# Patient Record
Sex: MALE | Race: WHITE | NOT HISPANIC OR LATINO | Employment: FULL TIME | URBAN - METROPOLITAN AREA
[De-identification: names, ages, dates, MRNs, and addresses within clinical notes are randomized per-mention and may not be internally consistent; named-entity substitution may affect disease eponyms.]

---

## 2018-11-15 ENCOUNTER — TRANSCRIBE ORDERS (OUTPATIENT)
Dept: ADMINISTRATIVE | Facility: HOSPITAL | Age: 68
End: 2018-11-15

## 2018-11-15 DIAGNOSIS — M79.602 ARM PAIN, LEFT: Primary | ICD-10-CM

## 2018-12-05 ENCOUNTER — HOSPITAL ENCOUNTER (OUTPATIENT)
Dept: INFUSION THERAPY | Facility: HOSPITAL | Age: 68
Discharge: HOME | End: 2018-12-05
Attending: INTERNAL MEDICINE | Admitting: PHYSICAL MEDICINE & REHABILITATION

## 2018-12-05 DIAGNOSIS — M79.602 ARM PAIN, LEFT: ICD-10-CM

## 2018-12-05 PROCEDURE — 95886 MUSC TEST DONE W/N TEST COMP: CPT

## 2018-12-05 PROCEDURE — 95909 NRV CNDJ TST 5-6 STUDIES: CPT

## 2018-12-05 NOTE — PROCEDURES
HISTORY:      The patient is a 68-year-old right hand dominant male who presents with a complaint of numbness and tingling of the left hand.  He states that he has been experiencing the symptoms for several years.  He reports a recent increase in severity of symptoms.    The patient denies neck pain.  He reports pain in the left shoulder.             I.  NERVE CONDUCTION STUDIES:       The distal latency of the left median motor nerve is 5.9 ms (upper limit of normal 4.3 ms).  The amplitude of evoked response is 2.9 mV.  The conduction velocity is 46 m/sec.       The distal latency of the left ulnar motor nerve is 3.7 ms.  The amplitude of evoked response is 6.1 mV at the wrist, 5.8 mV below the elbow, and 5.5 mV above the elbow.  The conduction velocity is 55 m/sec below the elbow and 43 m/sec above the elbow.       The distal latency of left median sensory nerve at 14 cm is 4.5 ms (upper limit of normal 3.6 ms).  The amplitude of evoked response is 10 microvolts.     The distal latency of left ulnar sensory nerve at 14 cm is 3.0 ms.  The amplitude of evoked response is 17 microvolts.     The distal latency of the left median sensory at 10 cm is 4.0 ms; the distal latency of the left radial sensory nerve at 10 cm is 2.1 ms (normal difference less than or equal to 0.4 ms).       II.  ELECTROMYOGRAPHY:       Electromyography was performed on the following muscles of the left upper extremity using a monopolar needle: Deltoid, biceps, brachioradialis, flexor carpi radialis, flexor carpi ulnaris, first dorsal interosseous, abductor digiti minimi, and abductor pollicis brevis.      There were no changes in insertional activity. Denervation potentials were present in the left abductor pollicis brevis.       The motor unit action potentials were of normal height and duration. The recruitment patterns were normal.       III.  IMPRESSION:        1. 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    2. There is no electrophysiologic evidence of a cervical radiculopathy or brachial plexopathy.      Atiya Alonso M.D.*    12/5/2018  10:56 AM

## 2019-09-05 ENCOUNTER — TRANSCRIBE ORDERS (OUTPATIENT)
Dept: PHYSICAL THERAPY | Facility: CLINIC | Age: 69
End: 2019-09-05

## 2019-09-05 DIAGNOSIS — M54.16 RADICULOPATHY, LUMBAR REGION: Primary | ICD-10-CM

## 2019-10-02 ENCOUNTER — HOSPITAL ENCOUNTER (OUTPATIENT)
Dept: GENERAL RADIOLOGY | Facility: HOSPITAL | Age: 69
Discharge: HOME | End: 2019-10-02
Admitting: INTERNAL MEDICINE

## 2019-10-02 DIAGNOSIS — M54.50 LOW BACK PAIN, UNSPECIFIED BACK PAIN LATERALITY, UNSPECIFIED CHRONICITY, UNSPECIFIED WHETHER SCIATICA PRESENT: ICD-10-CM

## 2019-10-02 PROCEDURE — 72110 X-RAY EXAM L-2 SPINE 4/>VWS: CPT

## 2021-01-15 ENCOUNTER — TRANSCRIBE ORDERS (OUTPATIENT)
Dept: ADMINISTRATIVE | Facility: HOSPITAL | Age: 71
End: 2021-01-15

## 2021-01-15 DIAGNOSIS — U07.1 CLINICAL DIAGNOSIS OF SEVERE ACUTE RESPIRATORY SYNDROME CORONAVIRUS 2 (SARS-COV-2) DISEASE: Primary | ICD-10-CM

## 2021-01-18 PROBLEM — U07.1 CLINICAL DIAGNOSIS OF COVID-19: Status: ACTIVE | Noted: 2021-01-18

## 2021-01-18 RX ORDER — METHYLPREDNISOLONE SODIUM SUCCINATE 125 MG/2ML
125 INJECTION, POWDER, LYOPHILIZED, FOR SOLUTION INTRAMUSCULAR; INTRAVENOUS AS NEEDED
Status: CANCELLED | OUTPATIENT
Start: 2021-01-19

## 2021-01-18 RX ORDER — DIPHENHYDRAMINE HYDROCHLORIDE 50 MG/ML
50 INJECTION INTRAMUSCULAR; INTRAVENOUS AS NEEDED
Status: CANCELLED | OUTPATIENT
Start: 2021-01-19

## 2021-01-18 RX ORDER — EPINEPHRINE 1 MG/ML
0.3 INJECTION, SOLUTION, CONCENTRATE INTRAVENOUS AS NEEDED
Status: CANCELLED | OUTPATIENT
Start: 2021-01-19

## 2021-01-18 RX ORDER — SODIUM CHLORIDE 9 MG/ML
250 INJECTION, SOLUTION INTRAVENOUS ONCE
Status: CANCELLED | OUTPATIENT
Start: 2021-01-19

## 2021-01-18 NOTE — PROGRESS NOTES
[facility]  Clinical Pharmacy Department     Pharmacy Consult/Review: COVID-19 Monoclonal Antibody    Cristiano Poole Sr. is a 70 y.o. male presenting with mild to moderate COVID-19 symptoms and has tested positive for SARS-CoV-2.    COVID-19 Monoclonal Antibody Ordered (bamlanivimab or casirivimab/imdevimab): 1/15/21  Ordering/Consulting Provider: Dr. Erick Galvan  Date of Confirmed SARS-CoV-2: 1/15/21  Date of Symptom Onset : 1/14/20    Allergies  Allergies as of 01/18/2021    (Not on File)     Microbiology  1/15: SARS-COV-2 PCR-positive    Assessment/Plan:    Patient is not hospitalized due to COVID-19 infection and does not require oxygen therapy or an increase in baseline oxygen flow rate due to COVID-19.   All inclusions, exclusions, and monitoring requirements listed below have been reviewed.    Patient has tested positive for SARS-CoV-2.  Patient is within 10 days of symptom onset.  Patient is not hospitalized due to COVID-19 infection.  Patient is not requiring oxygen therapy or an increase in baseline oxygen flow rate.   Patient is at high risk for progressing to severe COVID-19 and/or hospitalization as defined by having met the following criteria: age >/=65 years, BMI >/=35, CAD, hypertension.  Patient has no known hypersensitivity to any ingredient of bamlanivimab.  Ordering provider has documented that they obtained verbal consent and discussion of FDA EUA Fact Sheet for Patients and Caregivers (physical copy will be provided at infusion site).    Thank you for involving pharmacy in this patient's care. Please contact pharmacy with any questions or concerns.                           Zaida Ward, Pharm.D., [facility]   Clinical Staff Pharmacist  Phone Extension #[phone]  01/18/21 11:46 EST

## 2021-01-19 ENCOUNTER — HOSPITAL ENCOUNTER (OUTPATIENT)
Dept: INFUSION THERAPY | Facility: HOSPITAL | Age: 71
Discharge: HOME | End: 2021-01-19
Admitting: INTERNAL MEDICINE

## 2021-01-19 VITALS
RESPIRATION RATE: 16 BRPM | DIASTOLIC BLOOD PRESSURE: 84 MMHG | SYSTOLIC BLOOD PRESSURE: 134 MMHG | HEART RATE: 58 BPM | OXYGEN SATURATION: 98 % | TEMPERATURE: 97.5 F

## 2021-01-19 DIAGNOSIS — U07.1 CLINICAL DIAGNOSIS OF COVID-19: Primary | ICD-10-CM

## 2021-01-19 PROCEDURE — 25010000006 BAMLANIVIMAB 700 MG/20ML SOLUTION 20 ML VIAL: Performed by: INTERNAL MEDICINE

## 2021-01-19 PROCEDURE — 96366 THER/PROPH/DIAG IV INF ADDON: CPT

## 2021-01-19 PROCEDURE — 96365 THER/PROPH/DIAG IV INF INIT: CPT

## 2021-01-19 PROCEDURE — M0239 BAMLANIVIMAB-XXXX INFUSION: HCPCS | Performed by: INTERNAL MEDICINE

## 2021-01-19 RX ORDER — SODIUM CHLORIDE 9 MG/ML
250 INJECTION, SOLUTION INTRAVENOUS ONCE
Status: DISCONTINUED | OUTPATIENT
Start: 2021-01-19 | End: 2021-01-21 | Stop reason: HOSPADM

## 2021-01-19 RX ORDER — OMEGA-3/DHA/EPA/FISH OIL 60 MG-90MG
1 CAPSULE ORAL DAILY
COMMUNITY

## 2021-01-19 RX ORDER — HYDROCHLOROTHIAZIDE 12.5 MG/1
TABLET ORAL
COMMUNITY

## 2021-01-19 RX ORDER — METHYLPREDNISOLONE SODIUM SUCCINATE 125 MG/2ML
125 INJECTION, POWDER, LYOPHILIZED, FOR SOLUTION INTRAMUSCULAR; INTRAVENOUS AS NEEDED
Status: DISCONTINUED | OUTPATIENT
Start: 2021-01-19 | End: 2021-01-21 | Stop reason: HOSPADM

## 2021-01-19 RX ORDER — FOLIC ACID 1 MG/1
TABLET ORAL
COMMUNITY

## 2021-01-19 RX ORDER — METHYLPREDNISOLONE SODIUM SUCCINATE 125 MG/2ML
125 INJECTION, POWDER, LYOPHILIZED, FOR SOLUTION INTRAMUSCULAR; INTRAVENOUS AS NEEDED
Status: CANCELLED | OUTPATIENT
Start: 2021-01-19

## 2021-01-19 RX ORDER — SIMVASTATIN 40 MG
40 TABLET ORAL DAILY
COMMUNITY
Start: 2020-12-28

## 2021-01-19 RX ORDER — SODIUM CHLORIDE 9 MG/ML
250 INJECTION, SOLUTION INTRAVENOUS ONCE
Status: CANCELLED | OUTPATIENT
Start: 2021-01-19

## 2021-01-19 RX ORDER — DIPHENHYDRAMINE HYDROCHLORIDE 50 MG/ML
50 INJECTION INTRAMUSCULAR; INTRAVENOUS AS NEEDED
Status: CANCELLED | OUTPATIENT
Start: 2021-01-19

## 2021-01-19 RX ORDER — DIPHENHYDRAMINE HYDROCHLORIDE 50 MG/ML
50 INJECTION INTRAMUSCULAR; INTRAVENOUS AS NEEDED
Status: DISCONTINUED | OUTPATIENT
Start: 2021-01-19 | End: 2021-01-21 | Stop reason: HOSPADM

## 2021-01-19 RX ORDER — LISINOPRIL 20 MG/1
TABLET ORAL DAILY
COMMUNITY
Start: 2020-10-31

## 2021-01-19 RX ORDER — SILDENAFIL CITRATE 20 MG/1
TABLET ORAL
COMMUNITY

## 2021-01-19 RX ORDER — EPINEPHRINE 1 MG/ML
0.3 INJECTION, SOLUTION, CONCENTRATE INTRAVENOUS AS NEEDED
Status: DISCONTINUED | OUTPATIENT
Start: 2021-01-19 | End: 2021-01-21 | Stop reason: HOSPADM

## 2021-01-19 RX ORDER — HYDROCODONE BITARTRATE AND ACETAMINOPHEN 5; 325 MG/1; MG/1
TABLET ORAL AS NEEDED
COMMUNITY

## 2021-01-19 RX ORDER — MULTIPLE VITAMINS W/ MINERALS TAB 9MG-400MCG
TAB ORAL DAILY
COMMUNITY

## 2021-01-19 RX ORDER — CLOPIDOGREL BISULFATE 75 MG/1
75 TABLET ORAL DAILY
COMMUNITY
Start: 2020-12-28

## 2021-01-19 RX ORDER — ATENOLOL 100 MG/1
TABLET ORAL DAILY
COMMUNITY
Start: 2020-11-30

## 2021-01-19 RX ORDER — NITROGLYCERIN 0.4 MG/1
TABLET SUBLINGUAL
COMMUNITY

## 2021-01-19 RX ORDER — EPINEPHRINE 1 MG/ML
0.3 INJECTION, SOLUTION, CONCENTRATE INTRAVENOUS AS NEEDED
Status: CANCELLED | OUTPATIENT
Start: 2021-01-19

## 2021-01-19 RX ADMIN — SODIUM CHLORIDE 700 MG: 9 INJECTION, SOLUTION INTRAVENOUS at 09:20

## 2021-01-19 NOTE — PROGRESS NOTES
Patient reports chills, cough, runny nose, and headache as Covid symptoms. Patient given Bamlanivimab Patient Fact sheet, reviewed and verbalized understanding.     Patient tolerated infusion without complaints. No S/S reaction noted. Patient D/C'd from [facility] via wheelchair per screener to [address] [place] parking for transportation home.

## 2021-03-22 ENCOUNTER — BULK ORDERING (OUTPATIENT)
Dept: CASE MANAGEMENT | Facility: OTHER | Age: 71
End: 2021-03-22

## 2021-03-22 DIAGNOSIS — Z23 IMMUNIZATION DUE: ICD-10-CM

## 2021-05-03 ENCOUNTER — IMMUNIZATION (OUTPATIENT)
Dept: VACCINE CLINIC | Facility: HOSPITAL | Age: 71
End: 2021-05-03

## 2021-05-03 PROCEDURE — 91300 HC SARSCOV02 VAC 30MCG/0.3ML IM: CPT | Performed by: INTERNAL MEDICINE

## 2021-05-03 PROCEDURE — 0001A: CPT | Performed by: INTERNAL MEDICINE

## 2021-05-24 ENCOUNTER — IMMUNIZATION (OUTPATIENT)
Dept: VACCINE CLINIC | Facility: HOSPITAL | Age: 71
End: 2021-05-24

## 2021-05-24 PROCEDURE — 0002A: CPT | Performed by: INTERNAL MEDICINE

## 2021-05-24 PROCEDURE — 91300 HC SARSCOV02 VAC 30MCG/0.3ML IM: CPT | Performed by: INTERNAL MEDICINE

## 2021-12-08 ENCOUNTER — IMMUNIZATION (OUTPATIENT)
Dept: VACCINE CLINIC | Facility: HOSPITAL | Age: 71
End: 2021-12-08

## 2021-12-08 PROCEDURE — 91300 HC SARSCOV02 VAC 30MCG/0.3ML IM: CPT | Performed by: INTERNAL MEDICINE

## 2021-12-08 PROCEDURE — 0004A HC ADM SARSCOV2 30MCG/0.3ML BOOSTER: CPT | Performed by: INTERNAL MEDICINE

## 2022-05-31 ENCOUNTER — TRANSCRIBE ORDERS (OUTPATIENT)
Dept: ADMINISTRATIVE | Facility: HOSPITAL | Age: 72
End: 2022-05-31

## 2022-05-31 DIAGNOSIS — U07.1 CLINICAL DIAGNOSIS OF SEVERE ACUTE RESPIRATORY SYNDROME CORONAVIRUS 2 (SARS-COV-2) DISEASE: Primary | ICD-10-CM

## 2022-06-01 ENCOUNTER — HOSPITAL ENCOUNTER (OUTPATIENT)
Dept: INFUSION THERAPY | Facility: HOSPITAL | Age: 72
Discharge: HOME | End: 2022-06-01
Admitting: INTERNAL MEDICINE

## 2022-06-01 VITALS
DIASTOLIC BLOOD PRESSURE: 61 MMHG | HEART RATE: 57 BPM | SYSTOLIC BLOOD PRESSURE: 112 MMHG | OXYGEN SATURATION: 99 % | TEMPERATURE: 97.8 F | RESPIRATION RATE: 20 BRPM

## 2022-06-01 DIAGNOSIS — U07.1 COVID-19 VIRUS DETECTED: Primary | ICD-10-CM

## 2022-06-01 PROCEDURE — 96374 THER/PROPH/DIAG INJ IV PUSH: CPT

## 2022-06-01 PROCEDURE — M0222 HC INJECTION BEBTELOVIMAB: HCPCS | Performed by: INTERNAL MEDICINE

## 2022-06-01 PROCEDURE — 25010000002 INJECTION, BEBTELOVIMAB, 175 MG: Performed by: INTERNAL MEDICINE

## 2022-06-01 RX ORDER — DIPHENHYDRAMINE HYDROCHLORIDE 50 MG/ML
50 INJECTION INTRAMUSCULAR; INTRAVENOUS ONCE AS NEEDED
Status: DISCONTINUED | OUTPATIENT
Start: 2022-06-01 | End: 2022-06-03 | Stop reason: HOSPADM

## 2022-06-01 RX ORDER — BEBTELOVIMAB 87.5 MG/ML
175 INJECTION, SOLUTION INTRAVENOUS ONCE
Status: CANCELLED | OUTPATIENT
Start: 2022-06-01

## 2022-06-01 RX ORDER — DIPHENHYDRAMINE HCL 25 MG
50 CAPSULE ORAL ONCE AS NEEDED
OUTPATIENT
Start: 2022-06-01

## 2022-06-01 RX ORDER — METHYLPREDNISOLONE SODIUM SUCCINATE 125 MG/2ML
125 INJECTION, POWDER, LYOPHILIZED, FOR SOLUTION INTRAMUSCULAR; INTRAVENOUS AS NEEDED
OUTPATIENT
Start: 2022-06-01

## 2022-06-01 RX ORDER — METHYLPREDNISOLONE SODIUM SUCCINATE 125 MG/2ML
125 INJECTION, POWDER, LYOPHILIZED, FOR SOLUTION INTRAMUSCULAR; INTRAVENOUS AS NEEDED
Status: DISCONTINUED | OUTPATIENT
Start: 2022-06-01 | End: 2022-06-03 | Stop reason: HOSPADM

## 2022-06-01 RX ORDER — DIPHENHYDRAMINE HYDROCHLORIDE 50 MG/ML
50 INJECTION INTRAMUSCULAR; INTRAVENOUS ONCE AS NEEDED
OUTPATIENT
Start: 2022-06-01

## 2022-06-01 RX ORDER — BEBTELOVIMAB 87.5 MG/ML
175 INJECTION, SOLUTION INTRAVENOUS ONCE
Status: COMPLETED | OUTPATIENT
Start: 2022-06-01 | End: 2022-06-01

## 2022-06-01 RX ORDER — DIPHENHYDRAMINE HCL 25 MG
50 CAPSULE ORAL ONCE AS NEEDED
Status: DISCONTINUED | OUTPATIENT
Start: 2022-06-01 | End: 2022-06-03 | Stop reason: HOSPADM

## 2022-06-01 RX ORDER — SODIUM CHLORIDE 9 MG/ML
30 INJECTION, SOLUTION INTRAVENOUS ONCE
Status: DISCONTINUED | OUTPATIENT
Start: 2022-06-01 | End: 2022-06-03 | Stop reason: HOSPADM

## 2022-06-01 RX ORDER — SODIUM CHLORIDE 9 MG/ML
30 INJECTION, SOLUTION INTRAVENOUS ONCE
Status: CANCELLED | OUTPATIENT
Start: 2022-06-01 | End: 2022-06-01

## 2022-06-01 RX ADMIN — BEBTELOVIMAB 175 MG: 87.5 INJECTION, SOLUTION INTRAVENOUS at 13:55

## 2022-06-01 NOTE — PROGRESS NOTES
Patient provided with Fact Sheet for Patients, Parents and Caregivers Emergency Use Authorization (EUA) of bebtelovimab for Coronavirus Disease 2019 (COVID-19) form.    Reviewed and patient verbalized understanding.  Appropriate PPE worn during the care of the patient.  Advised patient not to receive Covid vaccine for 90 days.

## 2024-05-09 ENCOUNTER — HOSPITAL ENCOUNTER (OUTPATIENT)
Dept: GENERAL RADIOLOGY | Facility: HOSPITAL | Age: 74
Discharge: HOME | End: 2024-05-09
Payer: MEDICARE

## 2024-05-10 ENCOUNTER — HOSPITAL ENCOUNTER (OUTPATIENT)
Dept: GENERAL RADIOLOGY | Facility: HOSPITAL | Age: 74
Discharge: HOME | End: 2024-05-10
Payer: MEDICARE

## 2024-05-10 DIAGNOSIS — M17.12 ARTHRITIS OF KNEE, LEFT: ICD-10-CM

## 2024-05-10 PROCEDURE — 73562 X-RAY EXAM OF KNEE 3: CPT

## 2024-09-06 ENCOUNTER — HOSPITAL ENCOUNTER (EMERGENCY)
Facility: HOSPITAL | Age: 74
Discharge: HOME | End: 2024-09-06
Attending: EMERGENCY MEDICINE
Payer: MEDICARE

## 2024-09-06 VITALS
HEART RATE: 82 BPM | SYSTOLIC BLOOD PRESSURE: 137 MMHG | RESPIRATION RATE: 16 BRPM | TEMPERATURE: 98.2 F | OXYGEN SATURATION: 95 % | DIASTOLIC BLOOD PRESSURE: 84 MMHG

## 2024-09-06 DIAGNOSIS — R04.0 EPISTAXIS: Primary | ICD-10-CM

## 2024-09-06 LAB
APTT PPP: 44.6 SECONDS (ref 22.7–35.4)
BASOPHILS # BLD AUTO: 0.07 10*3/MM3 (ref 0–0.2)
BASOPHILS NFR BLD AUTO: 1.1 % (ref 0–1.5)
DEPRECATED RDW RBC AUTO: 46.3 FL (ref 37–54)
EOSINOPHIL # BLD AUTO: 0.13 10*3/MM3 (ref 0–0.4)
EOSINOPHIL NFR BLD AUTO: 2.1 % (ref 0.3–6.2)
ERYTHROCYTE [DISTWIDTH] IN BLOOD BY AUTOMATED COUNT: 13.6 % (ref 12.3–15.4)
HCT VFR BLD AUTO: 34.5 % (ref 37.5–51)
HGB BLD-MCNC: 11.7 G/DL (ref 13–17.7)
IMM GRANULOCYTES # BLD AUTO: 0.02 10*3/MM3 (ref 0–0.05)
IMM GRANULOCYTES NFR BLD AUTO: 0.3 % (ref 0–0.5)
INR PPP: 1.23 (ref 0.9–1.1)
LYMPHOCYTES # BLD AUTO: 1.43 10*3/MM3 (ref 0.7–3.1)
LYMPHOCYTES NFR BLD AUTO: 22.9 % (ref 19.6–45.3)
MCH RBC QN AUTO: 31.8 PG (ref 26.6–33)
MCHC RBC AUTO-ENTMCNC: 33.9 G/DL (ref 31.5–35.7)
MCV RBC AUTO: 93.8 FL (ref 79–97)
MONOCYTES # BLD AUTO: 0.4 10*3/MM3 (ref 0.1–0.9)
MONOCYTES NFR BLD AUTO: 6.4 % (ref 5–12)
NEUTROPHILS NFR BLD AUTO: 4.2 10*3/MM3 (ref 1.7–7)
NEUTROPHILS NFR BLD AUTO: 67.2 % (ref 42.7–76)
NRBC BLD AUTO-RTO: 0 /100 WBC (ref 0–0.2)
PLATELET # BLD AUTO: 204 10*3/MM3 (ref 140–450)
PMV BLD AUTO: 9 FL (ref 6–12)
PROTHROMBIN TIME: 15.8 SECONDS (ref 11.7–14.2)
RBC # BLD AUTO: 3.68 10*6/MM3 (ref 4.14–5.8)
WBC NRBC COR # BLD AUTO: 6.25 10*3/MM3 (ref 3.4–10.8)

## 2024-09-06 PROCEDURE — 85610 PROTHROMBIN TIME: CPT | Performed by: EMERGENCY MEDICINE

## 2024-09-06 PROCEDURE — 85730 THROMBOPLASTIN TIME PARTIAL: CPT | Performed by: EMERGENCY MEDICINE

## 2024-09-06 PROCEDURE — 99283 EMERGENCY DEPT VISIT LOW MDM: CPT

## 2024-09-06 PROCEDURE — 36415 COLL VENOUS BLD VENIPUNCTURE: CPT

## 2024-09-06 PROCEDURE — 85025 COMPLETE CBC W/AUTO DIFF WBC: CPT | Performed by: EMERGENCY MEDICINE

## 2024-09-06 RX ORDER — TRANEXAMIC ACID 100 MG/ML
500 INJECTION, SOLUTION INTRAVENOUS ONCE
Status: COMPLETED | OUTPATIENT
Start: 2024-09-06 | End: 2024-09-06

## 2024-09-06 RX ADMIN — TRANEXAMIC ACID 500 MG: 100 INJECTION, SOLUTION INTRAVENOUS at 16:10

## 2024-09-06 RX ADMIN — PHENYLEPHRINE HYDROCHLORIDE 2 SPRAY: 0.5 SPRAY NASAL at 16:09

## 2024-09-06 NOTE — ED PROVIDER NOTES
EMERGENCY DEPARTMENT ENCOUNTER  Room Number:  18/18  PCP: Erick Galvan Jr., MD  Independent Historians: Patient      HPI:  Chief Complaint: had concerns including Nose Bleed.     A complete HPI/ROS/PMH/PSH/SH/FH are unobtainable due to: None    Chronic or social conditions impacting patient care (Social Determinants of Health): None    Context: Cristiano Poole Sr. is a 73 y.o. male with a medical history of hypertension and CAD with stents who presents to the ED c/o acute epistaxis from the left nostril since 4:00 this morning.  Patient says he has had intermittent recurrence of bleeding throughout the day.  Incidentally, he had held his Eliquis medication earlier this week because of a colonoscopy procedure.  He just resumed the blood thinner last night with the first dose.  He has not had any Eliquis today.  He denies any recent injuries to the nose.  He denies any cough or cold or flulike symptoms.      Review of prior external notes (non-ED) -and- Review of prior external test results outside of this encounter: I independently reviewed the discharge summary from June 2, 2024 when patient was admitted for management of coronary artery disease    Prescription drug monitoring program review: LUIGI reviewed by Dereck Sosa MD       PAST MEDICAL HISTORY  Active Ambulatory Problems     Diagnosis Date Noted    Clinical diagnosis of COVID-19 01/18/2021    COVID-19 virus detected 05/31/2022     Resolved Ambulatory Problems     Diagnosis Date Noted    No Resolved Ambulatory Problems     Past Medical History:   Diagnosis Date    Ankle fracture     Diverticulitis     Hx of cardiac cath     Hypertension     Pneumonia          PAST SURGICAL HISTORY  Past Surgical History:   Procedure Laterality Date    ANKLE SURGERY      with screw placement    CARPAL TUNNEL RELEASE  12/2019    COLONOSCOPY      CORONARY ANGIOPLASTY WITH STENT PLACEMENT           FAMILY HISTORY  History reviewed. No pertinent family  history.      SOCIAL HISTORY  Social History     Socioeconomic History    Marital status:    Tobacco Use    Smoking status: Former    Smokeless tobacco: Current     Types: Chew   Substance and Sexual Activity    Alcohol use: Yes    Drug use: Never    Sexual activity: Defer         ALLERGIES  Sulfa antibiotics      REVIEW OF SYSTEMS  Review of Systems  Included in HPI  All systems reviewed and negative except for those discussed in HPI.      PHYSICAL EXAM    I have reviewed the triage vital signs and nursing notes.    ED Triage Vitals   Temp Heart Rate Resp BP SpO2   09/06/24 1424 09/06/24 1424 09/06/24 1424 09/06/24 1428 09/06/24 1424   98.2 °F (36.8 °C) 105 16 135/81 98 %      Temp src Heart Rate Source Patient Position BP Location FiO2 (%)   09/06/24 1424 09/06/24 1424 -- -- --   Tympanic Monitor          Physical Exam  GENERAL: alert, no acute distress  SKIN: Warm, dry, no rashes  HENT: Normocephalic, atraumatic, moist mucous membranes.  There is fresh blood in the left nostril but no significant active bleeding.  Posterior oropharynx appears normal.  EYES: no scleral icterus, normal conjunctivae  CV: regular rhythm, regular rate  RESPIRATORY: normal effort, lungs clear bilaterally  ABDOMEN: soft, nondistended, nontender  MUSCULOSKELETAL: no deformity, no asymmetry of extremities  NEURO: alert, moves all extremities, follows commands      LAB RESULTS  Recent Results (from the past 24 hour(s))   aPTT    Collection Time: 09/06/24  2:57 PM    Specimen: Arm, Left; Blood   Result Value Ref Range    PTT 44.6 (H) 22.7 - 35.4 seconds   Protime-INR    Collection Time: 09/06/24  2:57 PM    Specimen: Arm, Left; Blood   Result Value Ref Range    Protime 15.8 (H) 11.7 - 14.2 Seconds    INR 1.23 (H) 0.90 - 1.10   CBC Auto Differential    Collection Time: 09/06/24  2:57 PM    Specimen: Arm, Left; Blood   Result Value Ref Range    WBC 6.25 3.40 - 10.80 10*3/mm3    RBC 3.68 (L) 4.14 - 5.80 10*6/mm3    Hemoglobin 11.7 (L)  13.0 - 17.7 g/dL    Hematocrit 34.5 (L) 37.5 - 51.0 %    MCV 93.8 79.0 - 97.0 fL    MCH 31.8 26.6 - 33.0 pg    MCHC 33.9 31.5 - 35.7 g/dL    RDW 13.6 12.3 - 15.4 %    RDW-SD 46.3 37.0 - 54.0 fl    MPV 9.0 6.0 - 12.0 fL    Platelets 204 140 - 450 10*3/mm3    Neutrophil % 67.2 42.7 - 76.0 %    Lymphocyte % 22.9 19.6 - 45.3 %    Monocyte % 6.4 5.0 - 12.0 %    Eosinophil % 2.1 0.3 - 6.2 %    Basophil % 1.1 0.0 - 1.5 %    Immature Grans % 0.3 0.0 - 0.5 %    Neutrophils, Absolute 4.20 1.70 - 7.00 10*3/mm3    Lymphocytes, Absolute 1.43 0.70 - 3.10 10*3/mm3    Monocytes, Absolute 0.40 0.10 - 0.90 10*3/mm3    Eosinophils, Absolute 0.13 0.00 - 0.40 10*3/mm3    Basophils, Absolute 0.07 0.00 - 0.20 10*3/mm3    Immature Grans, Absolute 0.02 0.00 - 0.05 10*3/mm3    nRBC 0.0 0.0 - 0.2 /100 WBC         RADIOLOGY  No Radiology Exams Resulted Within Past 24 Hours      MEDICATIONS GIVEN IN ER  Medications   tranexamic acid 500 MG/5ML nasal (ED/Crit Care for epistaxis) - VIAL DISPENSE 500 mg (500 mg Nasal Given by Other 9/6/24 1610)         ORDERS PLACED DURING THIS VISIT:  Orders Placed This Encounter   Procedures    aPTT    Protime-INR    CBC Auto Differential    CBC & Differential         OUTPATIENT MEDICATION MANAGEMENT:  No current Epic-ordered facility-administered medications on file.     Current Outpatient Medications Ordered in Epic   Medication Sig Dispense Refill    Aspirin Buf,CaCarb-MgCarb-MgO, 81 MG tablet Daily.      atenolol (TENORMIN) 100 MG tablet Take  by mouth Daily.      Cholecalciferol (VITAMIN D3 PO) Daily.      clopidogrel (PLAVIX) 75 MG tablet Take 75 mg by mouth Daily.      folic acid (FOLVITE) 1 MG tablet folic acid 1 mg tablet   Take one tablet daily #30 Substitutions-      GLUCOSAMINE-CHONDROITIN PO Take  by mouth Daily.      hydroCHLOROthiazide (HYDRODIURIL) 12.5 MG tablet hydrochlorothiazide 12.5 mg tablet   TAKE 1 TABLET BY MOUTH EVERY DAY      HYDROcodone-acetaminophen (NORCO) 5-325 MG per tablet As  Needed.      lisinopril (PRINIVIL,ZESTRIL) 20 MG tablet Take  by mouth Daily.      metFORMIN (GLUCOPHAGE) 500 MG tablet Take 500 mg by mouth 2 (Two) Times a Day.      multivitamin with minerals tablet tablet Daily.      nitroglycerin (NITROSTAT) 0.4 MG SL tablet nitroglycerin 0.4 mg sublingual tablet      Omega-3 Fatty Acids (fish oil) 500 MG capsule capsule Take 1 capsule by mouth Daily.      Pyridoxine HCl (VITAMIN B-6 PO) Daily.      sildenafil (REVATIO) 20 MG tablet sildenafil (pulmonary hypertension) 20 mg tablet   Take 1-2 tabs by mouth as needed  No nitroglycerin within 48 hours of this medication.      simvastatin (ZOCOR) 40 MG tablet Take 40 mg by mouth Daily.           PROCEDURES  Procedures        PROGRESS, DATA ANALYSIS, CONSULTS, AND MEDICAL DECISION MAKING  All labs have been independently interpreted by me.  All radiology studies have been reviewed by me. All EKG's have been independently viewed and interpreted by me.  Discussion below represents my analysis of pertinent findings related to patient's condition, differential diagnosis, treatment plan and final disposition.    Differential diagnosis includes but is not limited to epistaxis, sinusitis, coagulopathy, anemia, hypertension.    Clinical Scores:                   ED Course as of 09/06/24 2040   Fri Sep 06, 2024   1526 Hemoglobin(!): 11.7 [COREY]   1526 Hematocrit(!): 34.5 [COREY]   1540 INR(!): 1.23 [COREY]   1540 Protime(!): 15.8 [COREY]   1540 PTT(!): 44.6 [COREY]   1540 I administered Afrin and then TXA via atomizer.  He is currently not bleeding.  Will continue to watch him for little while longer here. [COREY]   1618 I reassessed the patient.  He is not showing any signs of rebleeding.  I think he is safe for discharge.  Will have him self administer Afrin again tonight and tomorrow morning and then stop use of the Afrin bottle.  Will encourage follow-up with PCP and/or ENT doctor within the next week for repeat evaluation.  Will discuss with him usual  "\"return to ER\" instructions prior to discharge. [COREY]      ED Course User Index  [COREY] Dereck Sosa MD             AS OF 20:40 EDT VITALS:    BP - 137/84  HR - 82  TEMP - 98.2 °F (36.8 °C) (Tympanic)  O2 SATS - 95%    COMPLEXITY OF CARE  Admission was considered but after careful review of the patient's presentation, physical examination, diagnostic results, and response to treatment the patient may be safely discharged with outpatient follow-up.      DIAGNOSIS  Final diagnoses:   Epistaxis         DISPOSITION  ED Disposition       ED Disposition   Discharge    Condition   Stable    Comment   --                Please note that portions of this document were completed with a voice recognition program.    Note Disclaimer: At [facility], we believe that sharing information builds trust and better relationships. You are receiving this note because you recently visited [facility]. It is possible you will see health information before a provider has talked with you about it. This kind of information can be easy to misunderstand. To help you fully understand what it means for your health, we urge you to discuss this note with your provider.         Dereck Sosa MD  09/06/24 2040    "

## 2024-09-06 NOTE — DISCHARGE INSTRUCTIONS
As we discussed, use the Afrin spray 1 time tonight and 1 time tomorrow morning and then discontinue use of the Afrin bottle after that.  Please return to the emergency room for any worsening bleeding, pain, difficulties breathing or any other concerns.

## 2025-04-15 ENCOUNTER — TRANSCRIBE ORDERS (OUTPATIENT)
Dept: ADMINISTRATIVE | Facility: HOSPITAL | Age: 75
End: 2025-04-15
Payer: MEDICARE

## 2025-04-15 ENCOUNTER — TRANSCRIBE ORDERS (OUTPATIENT)
Dept: PHYSICAL THERAPY | Facility: CLINIC | Age: 75
End: 2025-04-15
Payer: MEDICARE

## 2025-04-15 DIAGNOSIS — M54.12 RADICULOPATHY, CERVICAL: Primary | ICD-10-CM

## 2025-04-15 DIAGNOSIS — G56.02 CARPAL TUNNEL SYNDROME ON LEFT: Primary | ICD-10-CM

## 2025-05-09 ENCOUNTER — HOSPITAL ENCOUNTER (OUTPATIENT)
Dept: NEUROLOGY | Facility: HOSPITAL | Age: 75
Discharge: HOME | End: 2025-05-09
Payer: MEDICARE

## 2025-05-09 DIAGNOSIS — G56.02 CARPAL TUNNEL SYNDROME ON LEFT: ICD-10-CM

## 2025-05-09 PROCEDURE — 95886 MUSC TEST DONE W/N TEST COMP: CPT

## 2025-05-09 PROCEDURE — 95910 NRV CNDJ TEST 7-8 STUDIES: CPT

## 2025-05-09 NOTE — PROCEDURES
"EMG and Nerve Conduction Studies    I.      Instrument used: Neuromax 1002  II.     Please see data sheets for tabular summary of NCS and details on methods, temperatures and lab standards.   III.    EMG muscles tested for upper extremity studies include the deltoid, biceps, triceps, pronator teres, extensor digitorum communis, first dorsal interosseous and abductor pollicis brevis.    IV.   EMG muscles tested for lower extremity studies include the vastus lateralis, tibialis anterior, peroneus longus, medial gastrocnemius and extensor digitorum brevis.    V.    Additional muscles tested as needed.  Paraspinal muscles tested as needed.   VI.   Please see data sheets for tabular summary of EMG findings.   VII. The complete report includes the data sheets.      Indication: Numbness tingling in digits 1 and 2 left hand.  Numbness and significant pain in the whole arm from the shoulder down if sitting in his arm chair with his elbow on the arm of the chair.  He has some mild neck pain.  He has prediabetes and no thyroid disease.  History: 74-year-old male with history as above      Ht: 68 inches  Wt: 210 pounds  HbA1C:   Lab Results   Component Value Date    HGBA1C 6.3 (H) 05/26/2024     TSH: No results found for: \"TSH\"    Technical summary:  Nerve conduction studies were obtained in the left arm with some comparisons on the right where indicated.  Skin temperatures were at least 32 °C measured on the palms.  Needle examination was obtained on selected muscles of the left arm.    Results:  1.  Prolonged median antidromic sensory distal latencies bilaterally at 4.4 ms on the left and 4.7 ms on the right with low amplitudes of 10.7 µV on the left and 11.3 µV on the right.  2.  Normal ulnar antidromic sensory distal latencies bilaterally with low amplitudes of 5.7 µV on the left and 8.7 µV on the right.  3.  Normal left radial sensory distal latency with low amplitude of 12.3 µV.  4.  Slow left median motor conduction " velocity at 47.5 m/s with prolonged distal latency of 5.0 ms.  The amplitudes were normal.  5.  Slow left ulnar motor conduction velocities at 47.2 m/s below the elbow and 41.1 m/s in the short segment across the elbow.  Normal distal latency with normal amplitudes from below the elbow and at the wrist but mildly low amplitude from above the elbow at 3.8 mV.  6.  Needle examination of selected muscles of the left arm showed 2+ fibrillations and positive sharp waves in the triceps and pronator teres with an increased number of large motor units increased firing rates and reduced interference patterns more notable in the triceps.  There were normal insertional activities in all other muscles tested.  There was a mild increased number of large motor units in the abductor pollicis brevis and first dorsal interosseous with some increase in firing rate and reduced interference pattern.  Cervical paraspinals at C6 and C7 showed no abnormality.    Impression:  Abnormal study most consistent with moderate peripheral neuropathy.  In addition there is evidence of an acute/subacute left C7 (or perhaps C6) radiculopathy versus middle trunk plexopathy.  (No needle exam changes were seen in the paraspinals to demonstrate root level involvement, however).  Study results were discussed with the patient.    Jimenez Quintanilla M.D.              Dictated utilizing Dragon dictation.

## 2025-06-23 ENCOUNTER — TREATMENT (OUTPATIENT)
Dept: PHYSICAL THERAPY | Facility: CLINIC | Age: 75
End: 2025-06-23
Payer: MEDICARE

## 2025-06-23 DIAGNOSIS — R29.898 LEFT LEG WEAKNESS: ICD-10-CM

## 2025-06-23 DIAGNOSIS — R29.898 LEFT ARM WEAKNESS: ICD-10-CM

## 2025-06-23 DIAGNOSIS — M54.16 RADICULOPATHY, LUMBAR REGION: Primary | ICD-10-CM

## 2025-06-23 DIAGNOSIS — R52 PAIN AGGRAVATED BY WALKING: ICD-10-CM

## 2025-06-23 DIAGNOSIS — M54.12 RADICULOPATHY, CERVICAL: ICD-10-CM

## 2025-06-23 PROCEDURE — 97161 PT EVAL LOW COMPLEX 20 MIN: CPT | Performed by: PHYSICAL THERAPIST

## 2025-06-23 PROCEDURE — 97110 THERAPEUTIC EXERCISES: CPT | Performed by: PHYSICAL THERAPIST

## 2025-06-23 NOTE — PROGRESS NOTES
Physical Therapy Initial Evaluation and Plan of Care    [facility] [place] [address]  [phone] (phone)  [phone] (fax)      Patient: Cristiano Poole Sr.   : 1950  Diagnosis/ICD-10 Code:  Radiculopathy, lumbar region [M54.16]  Referring practitioner: Erick Galvan Jr.,*  Date of Initial Visit: 2025  Today's Date: 2025  Patient seen for 1 sessions    Visit Diagnoses:    ICD-10-CM ICD-9-CM   1. Radiculopathy, lumbar region  M54.16 724.4   2. Radiculopathy, cervical  M54.12 723.4   3. Left arm weakness  R29.898 729.89   4. Left leg weakness  R29.898 729.89   5. Pain aggravated by walking  R52 780.96              Subjective Evaluation    History of Present Illness  Mechanism of injury: In November, he noticed pain in the left arm and inability to sleep on that side. He also has weakness in the left arm.   [place].  He was moving the dock and cutting grass. He did not feel one painful episode but the left leg hurt that afternoon.  He knew he did too much that morning.   It is painful to walk. He has been doing more sitting.     Nerve conduction study show left C7 acute radiculopathy,   MD prescribed a steroid pack last week for the leg pain. Sometimes the pain is less and then he takes a step and the pain is back.  The pain is between the left hip and the left knee and the left knee is numb. It may disturb his sleep.   He feels that pain when he stands up or walks. He feels more pain at the left lower back and left buttocks with more sitting than he is used to.   possible C6 as well.  PMH:  Carpal tunnel surgery B  He has not played golf in past month.  He is generally active.  He has cut and weeded the grass.  [place]     Pain  Pain scale: Left arm 3/10, left leg 5/10.  Pain scale at highest: Left arm 5/10,  left leg 8/10.  Aggravating factors: standing and  ambulation    Social Support  Lives in: one-story house (stairs to basement)  Lives with: spouse    Treatments  Current treatment: medication  Current treatment comments: Steroid pack.   Patient Goals  Patient goals for therapy: decreased pain, increased strength, independence with ADLs/IADLs and return to sport/leisure activities             Objective          Static Posture   General Observations  Scoliosis.     Head  Forward.    Shoulders  Rounded.    Lumbar Spine   Decreased lordosis.     Pelvis   Pelvis (Left): Elevated.     Palpation     Right   Hypertonic in the levator scapulae and upper trapezius. Tenderness of the levator scapulae and upper trapezius.     Neurological Testing     Sensation     Wrist/Hand   Left   Diminished: light touch    Right   Intact: light touch    Comments   Left light touch: Numb thumb and second fingers     Lumbar   Left   Diminished: light touch    Comments   Left light touch: Left ant knee    Active Range of Motion   Cervical/Thoracic Spine   Cervical    Flexion: 29 degrees   Extension: 31 degrees   Left lateral flexion: 13 degrees   Right lateral flexion: 13 degrees   Left rotation: 37 degrees   Right rotation: 40 degrees   Left Shoulder   Flexion: Left shoulder active forward flexion: Limited raising arm overhead.     Lumbar   Flexion: Active lumbar flexion: 2/3.   Extension: Active lumbar extension: 1/4 increases pain to left leg.   Left lateral flexion: Active left lumbar lateral flexion: 1/2 increases pain to left leg.   Right lateral flexion: Active right lumbar lateral flexion: 1/2.   Left rotation: Active left lumbar rotation: 2/3.   Right rotation: Active right lumbar rotation: 2/3.     Passive Range of Motion   Left Shoulder   Normal passive range of motion    Strength/Myotome Testing     Left Shoulder     Planes of Motion   Flexion: 3+   External rotation at 0°: 4+   Internal rotation at 0°: 4     Right Shoulder     Planes of Motion   Flexion: 5     Left Elbow    Flexion: 4+  Extension: 4    Right Elbow   Flexion: 5  Extension: 5    Left Wrist/Hand   Wrist extension: 4  Wrist flexion: 5     (2nd hand position)     Trial 1: 22 lbs    Right Wrist/Hand   Wrist extension: 5  Wrist flexion: 5     (2nd hand position)     Trial 1: 66 lbs    Left Hip   Planes of Motion   Flexion: 4-    Right Hip   Planes of Motion   Flexion: 5    Left Knee   Flexion: 4  Extension: 4    Right Knee   Flexion: 4+  Extension: 4+    Left Ankle/Foot   Dorsiflexion: 5  Plantar flexion: 5    Right Ankle/Foot   Dorsiflexion: 5  Plantar flexion: 5    Additional Strength Details  Able to bridge hips    Tests   Cervical     Left   Negative Spurling's sign.     Lumbar     Left   Negative passive SLR.     Right   Negative passive SLR.     Lumbar Flexibility Comments:   Mod tightness of B hip flexors, hamstrings, hip rotators     Ambulation   Weight-Bearing Status   Assistive device used: none    Observational Gait   Gait: antalgic   Decreased left stance time.     Additional Observational Gait Details  Left foot turned out to the side and walking increases the pain in the left leg   Gait on not smooth with increased sway side to side     Functional Assessment     Single Leg Stance   Left: 3 (Increases the pain in the left leg) seconds  Right: 3 seconds        See Exercise, Manual, and Modality Logs for complete treatment.   Access Code: [email or web address]  URL: [email or web address]  Date: 06/23/2025  Prepared by: Chantal Gonzalez    Exercises  - Standing 'L' Stretch at Counter  - 2 x daily - 7 x weekly - 1 sets - 3 reps - 20 sec hold  - Seated Flexion Stretch with Swiss Ball  - 2 x daily - 7 x weekly - 1 sets - 3 reps - 20 sec hold  - Hooklying Single Knee to Chest Stretch  - 2 x daily - 7 x weekly - 1 sets - 2 reps - 20 sec hold  - Supine Double Knee to Chest  - 2 x daily - 7 x weekly - 1 sets - 2 reps - 20 sec hold  - Supine Piriformis Stretch with Foot on Ground  - 2 x daily - 7 x weekly - 1 sets - 2  reps - 20 sec hold  - Supine Hamstring Stretch  - 2 x daily - 7 x weekly - 1 sets - 2 reps - 20 sec hold  - Supine March  - 2 x daily - 7 x weekly - 1 sets - 10 reps    Functional Outcome Score:   Modified Oswestry score is 44% disability with highest reported disability for standing, walking, sleeping and pain and Neck Disability Index (NDI) score is 11 /50 that is 22% disability with highest reported disability for sleeping, working and pain        Assessment & Plan       Assessment  Impairments: abnormal gait, abnormal or restricted ROM, activity intolerance, impaired balance, impaired physical strength, lacks appropriate home exercise program and pain with function   Functional limitations: sleeping, walking, uncomfortable because of pain, standing and reaching overhead   Assessment details: Cristiano Poole Sr. is a 74 y.o. active male referred to physical therapy for cervical and lumbar radiculopathy affecting the left arm and left leg. He presents with an evolving clinical presentation. The left leg symptoms started one week ago.  He has no known comorbidities or personal factors that may affect his progress in the plan of care.  Signs and symptoms are consistent with physical therapy diagnosis of left cervical radiculopathy causing numbness in the left thumb and second finger,  weakness and shoulder weakness. He has left lumbar radiculopathy with hip weakness and mild limp.  He is limited with standing, walking and sleeping.   Prognosis: good    Goals  Plan Goals: STGs to be met by:  08/11/25    STG 1 Patient will demonstrate an independent HEP for core  strength and ROM/flexibility.    STG 2 Patient will be independent with good back and neck postures and body mechanics to avoid strain to spine with ADLs    STG 3 Patient will report decreased acute left lower extremity pain from 5-8/10 to 0-3/10 that allows him to walk without limping    STG 4 Patient will increase his left  strength from 22 to 32 pounds  with reduced finger numbness by 20% or more so that he can perform fine motor activities better without dropping     STG 5 Patient will be able to sleep on his left side more comfortably by 25% or more    LTGs to be achieved by: 09/21/25    LTG 1 Patient will feel that he is able to be more active including returning to playing golf and performing regular yard work     LTG 2 Patient will be independent with a long term exercise plan for conditioning, strength and flexibility to manage his spine    LTG 3 Patient will report decreased functional disability on Modified Oswestry score from 42 % to 20 % or less improved for walking, standing and sleeping     LTG 4 Patient will increase his left  strength from 22 to 42 pounds with reduced finger numbness by 40% or more so that can better manage tools     Plan  Therapy options: will be seen for skilled therapy services  Planned modality interventions: traction  Planned therapy interventions: abdominal trunk stabilization, neuromuscular re-education, manual therapy, body mechanics training, flexibility, home exercise program, therapeutic activities and strengthening  Frequency: 2x week  Duration in weeks: 12  Treatment plan discussed with: patient        Timed:         Manual Therapy:         mins  74413;     Therapeutic Exercise:    12     mins  67684;     Neuromuscular Jose Roberto:        mins  07586;    Therapeutic Activity:          mins  58505;     Gait Training:           mins  15990;     Ultrasound:          mins  44005;    Self Care                            mins   38972      Un-Timed:  Electrical Stimulation:         mins  13683 ( );  Dry Needling  (1-2 muscles)                 mins 20560 (Self-pay)  Dry Needling (3-4 muscles)      mins 20561 (Self-pay)  Dry Needling Trial         mins DRYNDLTRIAL  (No Charge)  Traction          mins 66855  Low Eval     33     Mins  83136  Mod Eval          Mins  08089  High Eval                            Mins  34355    Timed  Treatment:   12   mins   Total Treatment:     45   mins    PT SIGNATURE: Chantal Gonzalez PT     [place] License Number: [id number]    Electronically signed by Chantal Gonzalez PT, 06/23/25, 8:48 AM EDT    DATE TREATMENT INITIATED: 6/23/2025    Medicare Initial Certification  Certification Period: 9/21/2025  I certify that the therapy services are furnished while this patient is under my care.  The services outlined above are required by this patient, and will be reviewed every 90 days.     PHYSICIAN: Erick Galvan Jr., MD   NPI: [id number]                                         DATE:     Please sign and return via fax to [phone] Thank you, [facility].

## 2025-06-24 ENCOUNTER — TRANSCRIBE ORDERS (OUTPATIENT)
Dept: PHYSICAL THERAPY | Facility: CLINIC | Age: 75
End: 2025-06-24
Payer: MEDICARE

## 2025-06-24 DIAGNOSIS — M54.16 RADICULOPATHY, LUMBAR REGION: Primary | ICD-10-CM

## 2025-07-08 ENCOUNTER — TRANSCRIBE ORDERS (OUTPATIENT)
Dept: ADMINISTRATIVE | Facility: HOSPITAL | Age: 75
End: 2025-07-08
Payer: MEDICARE

## 2025-07-08 DIAGNOSIS — M54.16 LUMBAR RADICULOPATHY: Primary | ICD-10-CM

## 2025-07-17 ENCOUNTER — TREATMENT (OUTPATIENT)
Dept: PHYSICAL THERAPY | Facility: CLINIC | Age: 75
End: 2025-07-17
Payer: MEDICARE

## 2025-07-17 DIAGNOSIS — R29.898 LEFT LEG WEAKNESS: ICD-10-CM

## 2025-07-17 DIAGNOSIS — R52 PAIN AGGRAVATED BY WALKING: ICD-10-CM

## 2025-07-17 DIAGNOSIS — M54.12 RADICULOPATHY, CERVICAL: Primary | ICD-10-CM

## 2025-07-17 DIAGNOSIS — M54.16 RADICULOPATHY, LUMBAR REGION: ICD-10-CM

## 2025-07-17 DIAGNOSIS — R29.898 LEFT ARM WEAKNESS: ICD-10-CM

## 2025-07-17 NOTE — PROGRESS NOTES
Physical Therapy Daily Treatment Note    [facility]  [address] [place] [address]  [phone] (phone)  [phone] (fax)    Patient: Cristiano Poole Sr.   : 1950  Diagnosis/ICD-10 Code:  Radiculopathy, cervical [M54.12]  Referring practitioner: Erick Galvan Jr.,*  Today's Date: 2025  Patient seen for 2 sessions    Visit Diagnoses:    ICD-10-CM ICD-9-CM   1. Radiculopathy, cervical  M54.12 723.4   2. Radiculopathy, lumbar region  M54.16 724.4   3. Left arm weakness  R29.898 729.89   4. Left leg weakness  R29.898 729.89   5. Pain aggravated by walking  R52 780.96              Subjective Cristiano reports that his left arm hurt using the weed eater and he felt his back more riding tractor on uneven ground.  He reports that he is worried about lying flat for upcoming heart MRI test due to the pain in the left arm is he lies flat.    Objective   See Exercise, Manual, and Modality Logs for complete treatment.   Access Code: [email or web address]  URL: [email or web address]  Date: 2025  Prepared by: Chantal Gonzalez    Exercises  - Standing 'L' Stretch at Counter  - 2 x daily - 7 x weekly - 1 sets - 3 reps - 20 sec hold  - Seated Flexion Stretch with Swiss Ball  - 2 x daily - 7 x weekly - 1 sets - 3 reps - 20 sec hold  - Hooklying Single Knee to Chest Stretch  - 2 x daily - 7 x weekly - 1 sets - 2 reps - 20 sec hold  - Supine Double Knee to Chest  - 2 x daily - 7 x weekly - 1 sets - 2 reps - 20 sec hold  - Supine Piriformis Stretch with Foot on Ground  - 2 x daily - 7 x weekly - 1 sets - 2 reps - 20 sec hold  - Supine Hamstring Stretch  - 2 x daily - 7 x weekly - 1 sets - 2 reps - 20 sec hold  - Supine March  - 2 x daily - 7 x weekly - 1 sets - 10 reps  - Standing Row with Anchored Resistance  - 1 x daily - 7 x weekly - 1 sets - 10 reps - 5 sec hold New GREEN band issued   - Doorway Pec Stretch at 60 Elevation  - 1 x daily - 7 x weekly - 1 sets - 6 reps - 10 sec  hold New  - Seated Cervical Sidebending AROM  - 1 x daily - 7 x weekly - 1 sets - 3 reps - 10 sec hold New  - Gentle Levator Scapulae Stretch  - 1 x daily - 7 x weekly - 1 sets - 3 reps - 10 sec hold New  - Seated Hamstring Stretch  - 1 x daily - 7 x weekly - 1 sets - 3 reps - 20 sec hold New    Neck posture emphasized with exercises.    Assessment/Plan    Neck feels good with the stretching exercises performed today.  He liked how the the yoga block used under his head supported his neck and alleviated the discomfort in the left arm.  His left hand  does feel somewhat weak gripping the band.        Timed:    Manual Therapy:    10     mins  33653;  Therapeutic Exercise:    30     mins  38280;     Neuromuscular Jose Roberto:    2    mins  75548;    Therapeutic Activity:          mins  53387;     Gait Training:           mins  82761;     Ultrasound:          mins  75995;    Aquatic Therapy           mins     47845;  Self Care                               mins   13988        Untimed:  Electrical Stimulation:           mins  69619 ( );  Traction:           mins  37265;   Dry Needling  (1-2 muscles)                  mins 20560 (Self-pay)  Dry Needling (3-4 muscles)  ____  20561 (Self-pay)  Dry Needling Trial             DRYNDLTRIAL  (No Charge)    Timed Treatment:   42   mins   Total Treatment:     42   mins    Chantal Gonzalez PT  Physical Therapist    [place] License:[id number]

## 2025-07-22 ENCOUNTER — TREATMENT (OUTPATIENT)
Dept: PHYSICAL THERAPY | Facility: CLINIC | Age: 75
End: 2025-07-22
Payer: MEDICARE

## 2025-07-22 DIAGNOSIS — R29.898 LEFT LEG WEAKNESS: ICD-10-CM

## 2025-07-22 DIAGNOSIS — M54.16 RADICULOPATHY, LUMBAR REGION: ICD-10-CM

## 2025-07-22 DIAGNOSIS — R29.898 LEFT ARM WEAKNESS: ICD-10-CM

## 2025-07-22 DIAGNOSIS — R52 PAIN AGGRAVATED BY WALKING: ICD-10-CM

## 2025-07-22 DIAGNOSIS — M54.12 RADICULOPATHY, CERVICAL: Primary | ICD-10-CM

## 2025-07-22 PROCEDURE — 97530 THERAPEUTIC ACTIVITIES: CPT | Performed by: PHYSICAL THERAPIST

## 2025-07-22 PROCEDURE — 97140 MANUAL THERAPY 1/> REGIONS: CPT | Performed by: PHYSICAL THERAPIST

## 2025-07-22 PROCEDURE — 97110 THERAPEUTIC EXERCISES: CPT | Performed by: PHYSICAL THERAPIST

## 2025-07-22 NOTE — PROGRESS NOTES
Physical Therapy Daily Treatment Note    [facility]  [address] [place] [address]  [phone] (phone)  [phone] (fax)    Patient: Cristiano Poole Sr.   : 1950  Diagnosis/ICD-10 Code:  Radiculopathy, cervical [M54.12]  Referring practitioner: Erick Galvan Jr.,*  Today's Date: 2025  Patient seen for 3 sessions    Visit Diagnoses:    ICD-10-CM ICD-9-CM   1. Radiculopathy, cervical  M54.12 723.4   2. Radiculopathy, lumbar region  M54.16 724.4   3. Left arm weakness  R29.898 729.89   4. Left leg weakness  R29.898 729.89   5. Pain aggravated by walking  R52 780.96              Subjective Cristiano reports that his neck has been a little sore.  He has not had as many symptoms in the left arm.  He was able to ride mower and weed eat grass this morning.  He felt better after last session.     Objective   See Exercise, Manual, and Modality Logs for complete treatment.     He has yoga block.  Recommend he lie his head on yoga block for 5 minutes.    Assessment/Plan    Left arm symptoms may be aggravated more if his head is not fullt flexed for manual traction.  Left arm was aggravated by the seated row exercise.  Plan to review exercises he can perform with his new bands.       Timed:    Manual Therapy:    15     mins  62829;  Therapeutic Exercise:    20     mins  67260;     Neuromuscular Jose Roberto:        mins  62101;    Therapeutic Activity:     5     mins  55229;     Gait Training:           mins  64575;     Ultrasound:          mins  52669;    Aquatic Therapy           mins     14275;  Self Care                               mins   45845        Untimed:  Electrical Stimulation:           mins  40532 ( );  Traction:           mins  12946;   Dry Needling  (1-2 muscles)                  mins  (Self-pay)  Dry Needling (3-4 muscles)  ____   (Self-pay)  Dry Needling Trial             DRYNDLTRIAL  (No Charge)    Timed Treatment:   40   mins   Total  Treatment:     40   mins    Chantal Gonzalez, PT  Physical Therapist    [place] License:[id number]

## 2025-07-24 ENCOUNTER — TREATMENT (OUTPATIENT)
Dept: PHYSICAL THERAPY | Facility: CLINIC | Age: 75
End: 2025-07-24
Payer: MEDICARE

## 2025-07-24 DIAGNOSIS — M54.12 RADICULOPATHY, CERVICAL: Primary | ICD-10-CM

## 2025-07-24 DIAGNOSIS — M54.16 RADICULOPATHY, LUMBAR REGION: ICD-10-CM

## 2025-07-24 DIAGNOSIS — R29.898 LEFT ARM WEAKNESS: ICD-10-CM

## 2025-07-24 DIAGNOSIS — R52 PAIN AGGRAVATED BY WALKING: ICD-10-CM

## 2025-07-24 DIAGNOSIS — R29.898 LEFT LEG WEAKNESS: ICD-10-CM

## 2025-07-24 NOTE — PROGRESS NOTES
30-Day / 10-Visit Progress Note       [facility]  [address] [place] [address]  [phone] (phone)  [phone] (fax)    Patient: Cristiano Poole Sr.   : 1950  Diagnosis/ICD-10 Code:  Radiculopathy, cervical [M54.12]  Referring practitioner: Erick Galvan Jr.,*  Date of Initial Visit: Type: THERAPY  Noted: 2025  Today's Date: 2025  Patient seen for 4 sessions      ICD-10-CM ICD-9-CM   1. Radiculopathy, cervical  M54.12 723.4   2. Radiculopathy, lumbar region  M54.16 724.4   3. Left arm weakness  R29.898 729.89   4. Left leg weakness  R29.898 729.89   5. Pain aggravated by walking  R52 780.96         Subjective:     Clinical Progress: improved  Home Program Compliance: Yes  Treatment has included:  therapeutic exercise, manual therapy, and patient education with home exercise program     Subjective Cristiano reports that he has been doing some yard work.  He does not feel that he is limping.  He reports no change in the numbness in his left hand.     Objective          Tenderness   Cervical Spine   Tenderness in the left transverse process (Lower cervical).     Neurological Testing     Sensation     Wrist/Hand   Left   Diminished: light touch    Comments   Left light touch: Numbness thumb and first finger     Lumbar   Left   Diminished: light touch    Comments   Left light touch: Numbness left thigh and knee    Strength/Myotome Testing     Left Wrist/Hand   Wrist extension: 5  Wrist flexion: 4+     (2nd hand position)     Trial 1: 20 lbs    Right Wrist/Hand   Wrist extension: 5  Wrist flexion: 5     (2nd hand position)     Trial 1: 50 lbs    Ambulation   Weight-Bearing Status   Assistive device used: none    Observational Gait   Gait: within functional limits         See Exercise, Manual, and Modality Logs for complete treatment.     Functional Outcome Score: Modified Oswestry score is 24% disability with highest reported disability for  sitting and sleeping and Neck Disability Index (NDI) score is 14 /50 that is 28% disability with highest reported disability for sleeping, lifting    Assessment & Plan       Assessment  Impairments: abnormal gait, abnormal or restricted ROM, activity intolerance, impaired balance, impaired physical strength, lacks appropriate home exercise program and pain with function   Functional limitations: sleeping, walking, uncomfortable because of pain, standing and reaching overhead   Assessment details: Cristiano Poole Sr. is a 74 y.o. active male referred to physical therapy for cervical and lumbar radiculopathy affecting the left arm and left leg. He has been seen for only 4 sessions due to scheduling. Signs and symptoms are consistent with physical therapy diagnosis of left cervical radiculopathy causing numbness in the left thumb and second finger,  weakness and shoulder weakness. He continues to have numbness in the left hand and  weakness.  He is worried about his ability to lie flat for upcoming heart MRI. Left lumbar radiculopathy appears to be resolving well.    Prognosis: good    Goals  Plan Goals: STGs to be met by:  08/11/25    STG 1 Patient will demonstrate an independent HEP for core  strength and ROM/flexibility.  Met  STG 2 Patient will be independent with good back and neck postures and body mechanics to avoid strain to spine with ADLs  Met  STG 3 Patient will report decreased acute left lower extremity pain from 5-8/10 to 0-3/10 that allows him to walk without limping  Met  STG 4 Patient will increase his left  strength from 22 to 32 pounds with reduced finger numbness by 20% or more so that he can perform fine motor activities better without dropping   Ongoing unchanged left  is 20 pounds   STG 5 Patient will be able to sleep on his left side more comfortably by 25% or more  Met  LTGs to be achieved by: 09/21/25    LTG 1 Patient will feel that he is able to be more active including returning  to playing golf and performing regular yard work   Met  LTG 2 Patient will be independent with a long term exercise plan for conditioning, strength and flexibility to manage his spine  Ongoing he went to the gym for the first time yesterday   LTG 3 Patient will report decreased functional disability on Modified Oswestry score from 42 % to 20 % or less improved for walking, standing and sleeping   Progressing 24%  LTG 4 Patient will increase his left  strength from 22 to 42 pounds with reduced finger numbness by 40% or more so that can better manage tools   Ongoing     Plan  Therapy options: will be seen for skilled therapy services  Planned modality interventions: traction  Planned therapy interventions: abdominal trunk stabilization, neuromuscular re-education, manual therapy, body mechanics training, flexibility, home exercise program, therapeutic activities and strengthening  Frequency: 2x week  Duration in weeks: 8  Treatment plan discussed with: patient           Recommendations: Continue as planned  Timeframe: 2 months  Prognosis to achieve goals: good    PT Signature: [facility] License Number: [id number]    Electronically signed by Chantal Gonzalez PT, 07/24/25, 9:32 AM EDT      Based upon review of the patient's progress and continued therapy plan, it is my medical opinion that Cristiano Poole should continue physical therapy treatment at [facility]  [address] [place] [address] [phone].    Signature: __________________________________  Erick Galvan Jr., MD    Timed:  Manual Therapy:    35     mins  08901;  Therapeutic Exercise:    5     mins  15196;     Neuromuscular Jose Roberto:    2    mins  16687;    Therapeutic Activity:          mins  63653;     Gait Training:           mins  31414;     Ultrasound:          mins  96605;    Self-Care:                 _____  mins  84126;     Untimed:  Electrical Stimulation:           mins   22516 ( );  Traction:           mins  39285;   Dry Needling  (1-2 muscles)         ____  mins 20560 (Self-pay)  Dry Needling (3-4 muscles) ____ mins 20561 (Self-pay)  Dry Needling Trial  ____  mins DRYNDLTRIAL  (No Charge)      Timed Treatment:   42   mins   Total Treatment:     42   mins

## 2025-08-11 ENCOUNTER — TELEPHONE (OUTPATIENT)
Dept: ORTHOPEDICS | Facility: OTHER | Age: 75
End: 2025-08-11
Payer: MEDICARE

## 2025-08-13 ENCOUNTER — TREATMENT (OUTPATIENT)
Dept: PHYSICAL THERAPY | Facility: CLINIC | Age: 75
End: 2025-08-13
Payer: MEDICARE

## 2025-08-13 DIAGNOSIS — R29.898 LEFT ARM WEAKNESS: ICD-10-CM

## 2025-08-13 DIAGNOSIS — R52 PAIN AGGRAVATED BY WALKING: ICD-10-CM

## 2025-08-13 DIAGNOSIS — M54.16 RADICULOPATHY, LUMBAR REGION: ICD-10-CM

## 2025-08-13 DIAGNOSIS — R29.898 LEFT LEG WEAKNESS: ICD-10-CM

## 2025-08-13 DIAGNOSIS — M54.12 RADICULOPATHY, CERVICAL: Primary | ICD-10-CM

## 2025-08-20 ENCOUNTER — TREATMENT (OUTPATIENT)
Dept: PHYSICAL THERAPY | Facility: CLINIC | Age: 75
End: 2025-08-20
Payer: MEDICARE

## 2025-08-20 DIAGNOSIS — M54.16 RADICULOPATHY, LUMBAR REGION: ICD-10-CM

## 2025-08-20 DIAGNOSIS — R29.898 LEFT ARM WEAKNESS: ICD-10-CM

## 2025-08-20 DIAGNOSIS — R52 PAIN AGGRAVATED BY WALKING: ICD-10-CM

## 2025-08-20 DIAGNOSIS — R29.898 LEFT LEG WEAKNESS: ICD-10-CM

## 2025-08-20 DIAGNOSIS — M54.12 RADICULOPATHY, CERVICAL: Primary | ICD-10-CM

## 2025-08-29 ENCOUNTER — TREATMENT (OUTPATIENT)
Dept: PHYSICAL THERAPY | Facility: CLINIC | Age: 75
End: 2025-08-29
Payer: MEDICARE

## 2025-08-29 ENCOUNTER — HOSPITAL ENCOUNTER (OUTPATIENT)
Dept: CARDIOLOGY | Facility: HOSPITAL | Age: 75
Discharge: HOME | End: 2025-08-29
Payer: MEDICARE

## 2025-08-29 ENCOUNTER — TRANSCRIBE ORDERS (OUTPATIENT)
Dept: LAB | Facility: HOSPITAL | Age: 75
End: 2025-08-29
Payer: MEDICARE

## 2025-08-29 ENCOUNTER — LAB (OUTPATIENT)
Dept: LAB | Facility: HOSPITAL | Age: 75
End: 2025-08-29
Payer: MEDICARE

## 2025-08-29 ENCOUNTER — TRANSCRIBE ORDERS (OUTPATIENT)
Dept: ADMINISTRATIVE | Facility: HOSPITAL | Age: 75
End: 2025-08-29
Payer: MEDICARE

## 2025-08-29 DIAGNOSIS — R60.0 LOCALIZED EDEMA: Primary | ICD-10-CM

## 2025-08-29 DIAGNOSIS — M54.12 RADICULOPATHY, CERVICAL: Primary | ICD-10-CM

## 2025-08-29 DIAGNOSIS — N04.9 NEPHROTIC SYNDROME: ICD-10-CM

## 2025-08-29 DIAGNOSIS — R60.0 LOCALIZED EDEMA: ICD-10-CM

## 2025-08-29 DIAGNOSIS — E88.09 HYPOALBUMINEMIA: ICD-10-CM

## 2025-08-29 DIAGNOSIS — E88.09 HYPOALBUMINEMIA: Primary | ICD-10-CM

## 2025-08-29 DIAGNOSIS — R29.898 LEFT ARM WEAKNESS: ICD-10-CM

## 2025-08-29 LAB
BH CV LOW VAS LEFT LESSER SAPH VESSEL: 1
BH CV LOWER VASCULAR LEFT COMMON FEMORAL AUGMENT: NORMAL
BH CV LOWER VASCULAR LEFT COMMON FEMORAL COMPETENT: NORMAL
BH CV LOWER VASCULAR LEFT COMMON FEMORAL COMPRESS: NORMAL
BH CV LOWER VASCULAR LEFT COMMON FEMORAL PHASIC: NORMAL
BH CV LOWER VASCULAR LEFT COMMON FEMORAL SPONT: NORMAL
BH CV LOWER VASCULAR LEFT DISTAL FEMORAL COMPRESS: NORMAL
BH CV LOWER VASCULAR LEFT GASTRONEMIUS COMPRESS: NORMAL
BH CV LOWER VASCULAR LEFT LESSER SAPH COMPRESS: NORMAL
BH CV LOWER VASCULAR LEFT LESSER SAPH THROMBUS: NORMAL
BH CV LOWER VASCULAR LEFT MID FEMORAL AUGMENT: NORMAL
BH CV LOWER VASCULAR LEFT MID FEMORAL COMPETENT: NORMAL
BH CV LOWER VASCULAR LEFT MID FEMORAL COMPRESS: NORMAL
BH CV LOWER VASCULAR LEFT MID FEMORAL PHASIC: NORMAL
BH CV LOWER VASCULAR LEFT MID FEMORAL SPONT: NORMAL
BH CV LOWER VASCULAR LEFT PERONEAL COMPRESS: NORMAL
BH CV LOWER VASCULAR LEFT POPLITEAL AUGMENT: NORMAL
BH CV LOWER VASCULAR LEFT POPLITEAL COMPETENT: NORMAL
BH CV LOWER VASCULAR LEFT POPLITEAL COMPRESS: NORMAL
BH CV LOWER VASCULAR LEFT POPLITEAL PHASIC: NORMAL
BH CV LOWER VASCULAR LEFT POPLITEAL SPONT: NORMAL
BH CV LOWER VASCULAR LEFT POSTERIOR TIBIAL COMPRESS: NORMAL
BH CV LOWER VASCULAR LEFT PROFUNDA FEMORAL COMPRESS: NORMAL
BH CV LOWER VASCULAR LEFT PROXIMAL FEMORAL COMPRESS: NORMAL
BH CV LOWER VASCULAR LEFT SAPHENOFEMORAL JUNCTION COMPRESS: NORMAL
BH CV LOWER VASCULAR RIGHT COMMON FEMORAL AUGMENT: NORMAL
BH CV LOWER VASCULAR RIGHT COMMON FEMORAL COMPETENT: NORMAL
BH CV LOWER VASCULAR RIGHT COMMON FEMORAL COMPRESS: NORMAL
BH CV LOWER VASCULAR RIGHT COMMON FEMORAL PHASIC: NORMAL
BH CV LOWER VASCULAR RIGHT COMMON FEMORAL SPONT: NORMAL
BH CV LOWER VASCULAR RIGHT DISTAL FEMORAL COMPRESS: NORMAL
BH CV LOWER VASCULAR RIGHT GASTRONEMIUS COMPRESS: NORMAL
BH CV LOWER VASCULAR RIGHT GREATER SAPH AK COMPRESS: NORMAL
BH CV LOWER VASCULAR RIGHT GREATER SAPH BK COMPRESS: NORMAL
BH CV LOWER VASCULAR RIGHT LESSER SAPH COMPRESS: NORMAL
BH CV LOWER VASCULAR RIGHT MID FEMORAL AUGMENT: NORMAL
BH CV LOWER VASCULAR RIGHT MID FEMORAL COMPETENT: NORMAL
BH CV LOWER VASCULAR RIGHT MID FEMORAL COMPRESS: NORMAL
BH CV LOWER VASCULAR RIGHT MID FEMORAL PHASIC: NORMAL
BH CV LOWER VASCULAR RIGHT MID FEMORAL SPONT: NORMAL
BH CV LOWER VASCULAR RIGHT PERONEAL COMPRESS: NORMAL
BH CV LOWER VASCULAR RIGHT POPLITEAL AUGMENT: NORMAL
BH CV LOWER VASCULAR RIGHT POPLITEAL COMPETENT: NORMAL
BH CV LOWER VASCULAR RIGHT POPLITEAL COMPRESS: NORMAL
BH CV LOWER VASCULAR RIGHT POPLITEAL PHASIC: NORMAL
BH CV LOWER VASCULAR RIGHT POPLITEAL SPONT: NORMAL
BH CV LOWER VASCULAR RIGHT POSTERIOR TIBIAL COMPRESS: NORMAL
BH CV LOWER VASCULAR RIGHT PROFUNDA FEMORAL COMPRESS: NORMAL
BH CV LOWER VASCULAR RIGHT PROXIMAL FEMORAL COMPRESS: NORMAL
BH CV LOWER VASCULAR RIGHT SAPHENOFEMORAL JUNCTION COMPRESS: NORMAL
BH CV VAS PRELIMINARY FINDINGS SCRIPTING: 1
CHOLEST SERPL-MCNC: 165 MG/DL (ref 0–200)
D DIMER PPP FEU-MCNC: 0.92 MCGFEU/ML (ref 0–0.73)
HBV SURFACE AG SERPL QL IA: NORMAL
HCV AB SER QL: NORMAL
HDLC SERPL-MCNC: 56 MG/DL (ref 40–60)
LDLC SERPL CALC-MCNC: 90 MG/DL (ref 0–100)
LDLC/HDLC SERPL: 1.57 {RATIO}
TRIGL SERPL-MCNC: 106 MG/DL (ref 0–150)
VLDLC SERPL-MCNC: 19 MG/DL (ref 5–40)

## 2025-08-29 PROCEDURE — 82784 ASSAY IGA/IGD/IGG/IGM EACH: CPT

## 2025-08-29 PROCEDURE — 86803 HEPATITIS C AB TEST: CPT

## 2025-08-29 PROCEDURE — 87340 HEPATITIS B SURFACE AG IA: CPT

## 2025-08-29 PROCEDURE — 36415 COLL VENOUS BLD VENIPUNCTURE: CPT

## 2025-08-29 PROCEDURE — 86038 ANTINUCLEAR ANTIBODIES: CPT

## 2025-08-29 PROCEDURE — 84165 PROTEIN E-PHORESIS SERUM: CPT

## 2025-08-29 PROCEDURE — 83521 IG LIGHT CHAINS FREE EACH: CPT

## 2025-08-29 PROCEDURE — 80061 LIPID PANEL: CPT

## 2025-08-29 PROCEDURE — 97140 MANUAL THERAPY 1/> REGIONS: CPT | Performed by: PHYSICAL THERAPIST

## 2025-08-29 PROCEDURE — 85379 FIBRIN DEGRADATION QUANT: CPT

## 2025-08-29 PROCEDURE — 86334 IMMUNOFIX E-PHORESIS SERUM: CPT

## 2025-08-29 PROCEDURE — 84155 ASSAY OF PROTEIN SERUM: CPT

## 2025-08-29 PROCEDURE — 97110 THERAPEUTIC EXERCISES: CPT | Performed by: PHYSICAL THERAPIST

## 2025-08-29 PROCEDURE — 93970 EXTREMITY STUDY: CPT
